# Patient Record
Sex: MALE | Race: WHITE | Employment: UNEMPLOYED | URBAN - METROPOLITAN AREA
[De-identification: names, ages, dates, MRNs, and addresses within clinical notes are randomized per-mention and may not be internally consistent; named-entity substitution may affect disease eponyms.]

---

## 2020-08-30 ENCOUNTER — APPOINTMENT (OUTPATIENT)
Dept: GENERAL RADIOLOGY | Age: 35
End: 2020-08-30

## 2020-08-30 ENCOUNTER — HOSPITAL ENCOUNTER (EMERGENCY)
Age: 35
Discharge: HOME | End: 2020-08-31

## 2020-08-30 VITALS
WEIGHT: 185 LBS | BODY MASS INDEX: 26.48 KG/M2 | SYSTOLIC BLOOD PRESSURE: 108 MMHG | TEMPERATURE: 98.1 F | RESPIRATION RATE: 17 BRPM | DIASTOLIC BLOOD PRESSURE: 75 MMHG | HEART RATE: 73 BPM | OXYGEN SATURATION: 97 % | HEIGHT: 70 IN

## 2020-08-30 PROCEDURE — 99284 EMERGENCY DEPT VISIT MOD MDM: CPT

## 2020-08-30 RX ORDER — DEXAMETHASONE 4 MG/1
10 TABLET ORAL ONCE
Status: COMPLETED | OUTPATIENT
Start: 2020-08-31 | End: 2020-08-31

## 2020-08-30 RX ORDER — ACETAMINOPHEN 500 MG
1000 TABLET ORAL ONCE
Status: COMPLETED | OUTPATIENT
Start: 2020-08-31 | End: 2020-08-31

## 2020-08-30 ASSESSMENT — PAIN SCALES - GENERAL: PAINLEVEL_OUTOF10: 8

## 2020-08-31 ENCOUNTER — APPOINTMENT (OUTPATIENT)
Dept: GENERAL RADIOLOGY | Age: 35
End: 2020-08-31

## 2020-08-31 PROCEDURE — 6370000000 HC RX 637 (ALT 250 FOR IP): Performed by: NURSE PRACTITIONER

## 2020-08-31 PROCEDURE — 71045 X-RAY EXAM CHEST 1 VIEW: CPT

## 2020-08-31 PROCEDURE — 6360000002 HC RX W HCPCS: Performed by: NURSE PRACTITIONER

## 2020-08-31 PROCEDURE — U0003 INFECTIOUS AGENT DETECTION BY NUCLEIC ACID (DNA OR RNA); SEVERE ACUTE RESPIRATORY SYNDROME CORONAVIRUS 2 (SARS-COV-2) (CORONAVIRUS DISEASE [COVID-19]), AMPLIFIED PROBE TECHNIQUE, MAKING USE OF HIGH THROUGHPUT TECHNOLOGIES AS DESCRIBED BY CMS-2020-01-R: HCPCS

## 2020-08-31 RX ORDER — ACETAMINOPHEN 500 MG
1000 TABLET ORAL EVERY 6 HOURS PRN
Qty: 30 TABLET | Refills: 0 | Status: SHIPPED | OUTPATIENT
Start: 2020-08-31

## 2020-08-31 RX ORDER — GUAIFENESIN/DEXTROMETHORPHAN 100-10MG/5
5 SYRUP ORAL 3 TIMES DAILY PRN
Qty: 120 ML | Refills: 0 | Status: SHIPPED | OUTPATIENT
Start: 2020-08-31 | End: 2020-09-10

## 2020-08-31 RX ADMIN — ACETAMINOPHEN 1000 MG: 500 TABLET, FILM COATED ORAL at 00:16

## 2020-08-31 RX ADMIN — DEXAMETHASONE 10 MG: 4 TABLET ORAL at 00:16

## 2020-08-31 ASSESSMENT — ENCOUNTER SYMPTOMS
COUGH: 1
DIARRHEA: 0
ABDOMINAL PAIN: 0
SHORTNESS OF BREATH: 0
NAUSEA: 0
SORE THROAT: 1
VOMITING: 0
CHEST TIGHTNESS: 0

## 2020-08-31 ASSESSMENT — PAIN SCALES - GENERAL: PAINLEVEL_OUTOF10: 8

## 2020-08-31 NOTE — ED PROVIDER NOTES
[address] [facility]        Pt Name: Sherry Conley  MRN: [id number]  [place] 1985  Date of evaluation: 8/30/2020  Provider: JAX Santoyo CNP  PCP: No primary care provider on file. JAC. I have evaluated this patient. My supervising physician was available for consultation. CHIEF COMPLAINT       Chief Complaint   Patient presents with    Headache     headache, loss of sense of taste and smell, and fever for two days        HISTORY OF PRESENT ILLNESS   (Location, Timing/Onset, Context/Setting, Quality, Duration, Modifying Factors, Severity, Associated Signs and Symptoms)  Note limiting factors. Sherry Conley is a 30 y.o. male presents to the emergency department today with complaint of headache, loss of taste and smell, cough, body aches. He reports onset of symptoms 2 days ago. Denies any recent known sick exposure, patient reports that he wears his mask at all times. He did recently travel for entertainment but does not know of sick exposure. The patient's girlfriend is ill with similar symptoms. Denies any fever, lightheadedness, dizziness, visual disturbances. No chest pain or pressure. No neck or back pain. No abdominal pain, nausea, vomiting, diarrhea, constipation, or dysuria. No rash. Nursing Notes were all reviewed and agreed with or any disagreements were addressed in the HPI. REVIEW OF SYSTEMS    (2-9 systems for level 4, 10 or more for level 5)     Review of Systems   Constitutional: Positive for chills and fatigue. Negative for activity change. HENT: Positive for sore throat. Loss of taste and smell   Respiratory: Positive for cough. Negative for chest tightness and shortness of breath. Cardiovascular: Negative for chest pain. Gastrointestinal: Negative for abdominal pain, diarrhea, nausea and vomiting. Genitourinary: Negative for dysuria.    Musculoskeletal: Positive for arthralgias and myalgias. All other systems reviewed and are negative. Positives and Pertinent negatives as per HPI. Except as noted above in the ROS, all other systems were reviewed and negative. PAST MEDICAL HISTORY   History reviewed. No pertinent past medical history. SURGICAL HISTORY   History reviewed. No pertinent surgical history. [address]       Discharge Medication List as of 8/31/2020 12:59 AM            ALLERGIES     Patient has no known allergies. FAMILYHISTORY     History reviewed. No pertinent family history. SOCIAL HISTORY       Social History     Tobacco Use    Smoking status: Never Smoker    Smokeless tobacco: Never Used   Substance Use Topics    Alcohol use: Yes     Comment: socially    Drug use: Yes     Types: Marijuana       SCREENINGS             PHYSICAL EXAM    (up to 7 for level 4, 8 or more for level 5)     ED Triage Vitals [08/30/20 2327]   BP Temp Temp src Pulse Resp SpO2 Height Weight   108/75 98.1 °F (36.7 °C) -- 73 17 97 % 5' 10\" (1.778 m) 185 lb (83.9 kg)       Physical Exam  Vitals signs and nursing note reviewed. Constitutional:       Appearance: He is well-developed. He is not diaphoretic. HENT:      Head: Normocephalic and atraumatic. Right Ear: External ear normal.      Left Ear: External ear normal.   Eyes:      General:         Right eye: No discharge. Left eye: No discharge. Neck:      Musculoskeletal: Normal range of motion and neck supple. Vascular: No JVD. Cardiovascular:      Rate and Rhythm: Normal rate and regular rhythm. Pulses: Normal pulses. Heart sounds: Normal heart sounds. Pulmonary:      Effort: Pulmonary effort is normal. No respiratory distress. Breath sounds: Normal breath sounds. Abdominal:      Palpations: Abdomen is soft. Musculoskeletal: Normal range of motion. Skin:     General: Skin is warm and dry. Coloration: Skin is not pale.    Neurological:      Mental Status: He is alert and oriented to person, place, and time. Psychiatric:         Behavior: Behavior normal.         DIAGNOSTIC RESULTS   LABS:    Labs Reviewed   [id number]       All other labs were within normal range or not returned as of this dictation. EKG: All EKG's are interpreted by the Emergency Department Physician in the absence of a cardiologist.  Please see their note for interpretation of EKG. RADIOLOGY:   Non-plain film images such as CT, Ultrasound and MRI are read by the radiologist. Plain radiographic images are visualized and preliminarily interpreted by the ED Provider with the below findings:        Interpretation per the Radiologist below, if available at the time of this note:    XR CHEST PORTABLE   Final Result   Normal portable chest examination. Old healed fracture deformity of the left clavicle. Xr Chest Portable    Result Date: 8/31/2020  EXAMINATION: ONE XRAY VIEW OF THE CHEST 8/31/2020 12:00 am COMPARISON: None. HISTORY: ORDERING SYSTEM PROVIDED HISTORY: SOB TECHNOLOGIST PROVIDED HISTORY: Reason for exam:->SOB FINDINGS: Heart size and configuration are normal.  Hilar and mediastinal structures are unremarkable. The lungs are clear. No pneumothorax or pleural fluid. Old healed fracture deformity of the left clavicle. No acute bone finding. Normal portable chest examination. Old healed fracture deformity of the left clavicle.            PROCEDURES   Unless otherwise noted below, none     Procedures    CRITICAL CARE TIME   N/A    CONSULTS:  None      EMERGENCY DEPARTMENT COURSE and DIFFERENTIAL DIAGNOSIS/MDM:   Vitals:    Vitals:    08/30/20 2327   BP: 108/75   Pulse: 73   Resp: 17   Temp: 98.1 °F (36.7 °C)   SpO2: 97%   Weight: 185 lb (83.9 kg)   Height: 5' 10\" (1.778 m)       Patient was given the following medications:  Medications   acetaminophen (TYLENOL) tablet 1,000 mg (1,000 mg Oral Given 8/31/20 0016)   dexamethasone (DECADRON) tablet 10 mg (10 mg Oral Given 8/31/20 0016)           Briefly, this is a 30year old male that presents to the emergency department today with complaint of headache, loss of taste and smell, cough, body aches. He reports onset of symptoms 2 days ago. Denies any recent known sick exposure, patient reports that he wears his mask at all times. He did recently travel for entertainment but does not know of sick exposure. The patient's girlfriend is ill with similar symptoms. He is feeling better following tylenol and decadron. Exam was benign. covid swab is pending. Xray shows no acute cardiopulmonary abnormality. The patient is low risk for mortality based on demographic, history, and clinical factors. Specific COVID19 testing is pending in this patient. Given best available information and clinical assessment, I estimate the risk of hospitalization to be greater than the risk of treatment at home. I have explained to the patient that the risk could rapidly change, given precautions for return and instructions on how to minimize being contagious. FINAL IMPRESSION      1. Suspected COVID-19 virus infection    2.  Viral syndrome          DISPOSITION/PLAN   DISPOSITION Decision To Discharge 08/31/2020 12:50:17 AM      PATIENT REFERREDTO:  [facility]) Pre-Services  [phone]  Schedule an appointment as soon as possible for a visit         DISCHARGE MEDICATIONS:  Discharge Medication List as of 8/31/2020 12:59 AM      START taking these medications    Details   guaiFENesin-dextromethorphan (ROBITUSSIN DM) 100-10 MG/5ML syrup Take 5 mLs by mouth 3 times daily as needed for Cough, Disp-120 mL,R-0Print      acetaminophen (APAP EXTRA STRENGTH) 500 MG tablet Take 2 tablets by mouth every 6 hours as needed for Pain, Disp-30 tablet,R-0Print             DISCONTINUED MEDICATIONS:  Discharge Medication List as of 8/31/2020 12:59 AM                 (Please note that portions of this note were completed with a voice recognition program. Efforts were made to edit the dictations but occasionally words are mis-transcribed.)    JAX Oneill CNP (electronically signed)           JAX Oneill CNP  08/31/20 0976

## 2020-08-31 NOTE — ED NOTES
Pt reports headache in bilateral frontal sinus area and rates 8/10. No n/v. Minimal photosensitivity. Pt a, a/o x 3, resps easy. In no acute or apparent distress. Medicated as noted. Lights dimmed for comfort.        Jw Persons Post, REX  08/31/20 [id number]

## 2020-09-01 ENCOUNTER — CARE COORDINATION (OUTPATIENT)
Dept: CARE COORDINATION | Age: 35
End: 2020-09-01

## 2020-09-01 LAB — SARS-COV-2, NAA: NOT DETECTED

## 2020-09-01 NOTE — CARE COORDINATION
Patient contacted regarding recent visit for viral symptoms. This Ressie Nazanin contacted the patient by telephone to perform post discharge call. Left message on machine for a return call regarding ED visit 8/30. If no return call, will attempt to reach pt again.     Mortimer Kerbs  [phone]

## 2020-09-02 NOTE — CARE COORDINATION
Patient contacted regarding recent visit for viral symptoms. This Ressie Nazanin contacted the patient by telephone to perform post discharge call. Left another message on machine for a return call regarding ED visit 8/30. Will wait for a return call.     Mortimer Kerbs  [phone]

## 2021-10-04 NOTE — ED NOTES
Pt reports no decrease in headache s/p meds. Remains a, a/o x 3, resps easy. No work of breathing noted. Discharged as noted.      Tyson Osei, REX  08/31/20 0649 Yes